# Patient Record
Sex: MALE | Race: WHITE | HISPANIC OR LATINO | Employment: FULL TIME | URBAN - METROPOLITAN AREA
[De-identification: names, ages, dates, MRNs, and addresses within clinical notes are randomized per-mention and may not be internally consistent; named-entity substitution may affect disease eponyms.]

---

## 2018-09-24 ENCOUNTER — HOSPITAL ENCOUNTER (EMERGENCY)
Facility: HOSPITAL | Age: 33
Discharge: HOME | End: 2018-09-24
Attending: EMERGENCY MEDICINE
Payer: COMMERCIAL

## 2018-09-24 VITALS
OXYGEN SATURATION: 100 % | WEIGHT: 145 LBS | RESPIRATION RATE: 18 BRPM | SYSTOLIC BLOOD PRESSURE: 143 MMHG | BODY MASS INDEX: 24.75 KG/M2 | HEART RATE: 88 BPM | HEIGHT: 64 IN | TEMPERATURE: 98 F | DIASTOLIC BLOOD PRESSURE: 92 MMHG

## 2018-09-24 DIAGNOSIS — G45.9 TIA (TRANSIENT ISCHEMIC ATTACK): ICD-10-CM

## 2018-09-24 DIAGNOSIS — G45.0 VERTEBROBASILAR ARTERY SYNDROME: ICD-10-CM

## 2018-09-24 DIAGNOSIS — H53.9 VISUAL DISTURBANCE: Primary | ICD-10-CM

## 2018-09-24 LAB
CHOLEST SERPL-MCNC: 173 MG/DL
CHOLEST/HDLC SERPL: 2.6 {RATIO}
DIASTOLIC DYSFUNCTION: NO
ESTIMATED AVG GLUCOSE: 97 MG/DL
ESTIMATED PA SYSTOLIC PRESSURE: 19.48
HBA1C MFR BLD HPLC: 5 %
HDLC SERPL-MCNC: 66 MG/DL
HDLC SERPL: 38.2 %
LDLC SERPL CALC-MCNC: 89.6 MG/DL
MITRAL VALVE MOBILITY: NORMAL
MITRAL VALVE REGURGITATION: NORMAL
NONHDLC SERPL-MCNC: 107 MG/DL
RETIRED EF AND QEF - SEE NOTES: 55 (ref 55–65)
TRIGL SERPL-MCNC: 87 MG/DL
TSH SERPL DL<=0.005 MIU/L-ACNC: 1.31 UIU/ML

## 2018-09-24 PROCEDURE — 93306 TTE W/DOPPLER COMPLETE: CPT | Mod: 26,,, | Performed by: INTERNAL MEDICINE

## 2018-09-24 PROCEDURE — 80061 LIPID PANEL: CPT

## 2018-09-24 PROCEDURE — 25000003 PHARM REV CODE 250: Performed by: PSYCHIATRY & NEUROLOGY

## 2018-09-24 PROCEDURE — 99284 EMERGENCY DEPT VISIT MOD MDM: CPT | Mod: 25

## 2018-09-24 PROCEDURE — 84443 ASSAY THYROID STIM HORMONE: CPT

## 2018-09-24 PROCEDURE — 93306 TTE W/DOPPLER COMPLETE: CPT

## 2018-09-24 PROCEDURE — 99285 EMERGENCY DEPT VISIT HI MDM: CPT | Mod: ,,,

## 2018-09-24 PROCEDURE — 99283 EMERGENCY DEPT VISIT LOW MDM: CPT | Mod: ,,, | Performed by: PSYCHIATRY & NEUROLOGY

## 2018-09-24 PROCEDURE — 83036 HEMOGLOBIN GLYCOSYLATED A1C: CPT

## 2018-09-24 RX ORDER — ASPIRIN 325 MG
325 TABLET ORAL DAILY
Status: DISCONTINUED | OUTPATIENT
Start: 2018-09-24 | End: 2018-09-24 | Stop reason: HOSPADM

## 2018-09-24 RX ADMIN — ASPIRIN 325 MG ORAL TABLET 325 MG: 325 PILL ORAL at 10:09

## 2018-09-24 NOTE — ED TRIAGE NOTES
Pt reports around 0700 today began having vision issues. Reports was sitting at a computer reports seeing a dot to center of vision in both eyes. Reports feels like dot is moving left, currently reports diminished/blurred vision to right medial side and left lateral side. Not sure if caffeine related, had coffee this am. Denies any contact or glasses use. Denies any eye surgeries. Denies trauma or other complaints.

## 2018-09-24 NOTE — DISCHARGE INSTRUCTIONS
Please follow up with Dr. Velasquez in 4 weeks.     Our goal in the emergency department is to always give you outstanding care and exceptional service. You may receive a survey by mail or e-mail in the next week regarding your experience in our ED. We would greatly appreciate your completing and returning the survey. Your feedback provides us with a way to recognize our staff who give very good care and it helps us learn how to improve when your experience was below our aspiration of excellence.

## 2018-09-24 NOTE — ED NOTES
Patient identifiers have been checked and are correct.      LOC: The patient is awake, alert, and aware of environment. The patient is oriented x 3 and speaking appropriately.   APPEARANCE: No acute distress noted.   PSYCHOSOCIAL: Patient is calm and cooperative.   SKIN: The skin is warm, dry  RESPIRATORY: Airway is open and patent. Bilateral chest rise and fall. Respirations are spontaneous, even and unlabored. Normal effort and rate noted. No accessory muscle use noted.   MUSCULOSKELETAL: No obvious deformities noted.

## 2018-09-24 NOTE — ED NOTES
Pt updated on POC- waiting for vascular neurology. Pt denies any current complaints, reports vision symptoms have resolved and that PA was aware. Reports vision back to baseline. Will continue to monitor.

## 2018-09-24 NOTE — CONSULTS
[facility]  Vascular Neurology  Comprehensive Stroke Center  Consult Note    Inpatient consult to Vascular (Stroke) Neurology  Consult performed by: Allyson Garcia MD  Consult ordered by: Cici Mclean PA-C        Assessment/Plan:     Patient is a 33 y.o. year old male with:    Vertebrobasilar artery syndrome    33 yr old male with no pertinent PMHx who presented with transient left hemianopsia without other neurologic complaints. Worked up for TIA starting with MRI/MRA brain and neck    Antithrombotics for secondary stroke prevention: Antiplatelets: Aspirin: 325 mg daily    Statins for secondary stroke prevention and hyperlipidemia, if present:   If LDL>100, recommend starting atorvastatin 40mg qd    Aggressive risk factor modification: None     Rehab efforts: None: Reason: No deficits    Diagnostics ordered/pending: HgbA1C to assess blood glucose levels, Lipid Profile to assess cholesterol levels, TTE to assess cardiac function/status , TSH to assess thyroid function, Other: TCD with IV contrast to rule out PFO    BP parameters: SBP <140    MRI brain without evidence of acute infarct and MRA neck unremarkable for area of focal stenosis. Patient requesting discharge home with close outpatient follow up. 2D ECHO and tcd bubble study to be done as an outpatient.             STROKE DOCUMENTATION          NIH Scale:  1a. Level Of Consciousness: 0-->Alert: keenly responsive  1b. LOC Questions: 0-->Answers both questions correctly  1c. LOC Commands: 0-->Performs both tasks correctly  2. Best Gaze: 0-->Normal  3. Visual: 0-->No visual loss  4. Facial Palsy: 0-->Normal symmetrical movements  5a. Motor Arm, Left: 0-->No drift: limb holds 90 (or 45) degrees for full 10 secs  5b. Motor Arm, Right: 0-->No drift: limb holds 90 (or 45) degrees for full 10 secs  6a. Motor Leg, Left: 0-->No drift: leg holds 30 degree position for full 5 secs  6b. Motor Leg, Right: 0-->No drift: leg holds 30 degree  position for full 5 secs  7. Limb Ataxia: 0-->Absent  8. Sensory: 0-->Normal: no sensory loss  9. Best Language: 0-->No aphasia: normal  10. Dysarthria: 0-->Normal  11. Extinction and Inattention (formerly Neglect): 0-->No abnormality  Total (NIH Stroke Scale): 0    Modified Yalobusha Score: 0  Sean Coma Scale:15   ABCD2 Score: 2  [id number] Score:   HAS -BLED Score:   ICH Score:   Hunt & Marlow Classification:       Thrombolysis Candidate? No, NIH 0. TIA      Interventional Revascularization Candidate?   Is the patient eligible for mechanical endovascular reperfusion (ROBER)?  No; No large vessel occlusion and No; No significant neurological deficit      Hemorrhagic change of an Ischemic Stroke: Does this patient have an ischemic stroke with hemorrhagic changes? 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 He noticed that he was seeing an area of blurry vision in the middle of his visual field initially but then later noticed that when he covered his right eye, the vision was blurry in the left side of his visual field and the same when he covered his left eye, visual field on the left again was blurry. Symptoms lasted for about 30 minutes before resolution.  No prior episodes similar to this in the past.   Takes no medications - was on vyvanse in the past and stopped taking it in May 2018. No personal history of hypercoagulable states.  No family history of strokes         Past Medical History:   Diagnosis Date    Asthma      Past Surgical History:   Procedure Laterality Date    HERNIA REPAIR      URETER SURGERY      As a child     Family History   Problem Relation Age of Onset    Hypertension Mother     Hernia Maternal Uncle     Hernia Maternal Grandmother     Hernia Maternal Uncle      Social History     Tobacco Use     Smoking status: Never Smoker   Substance Use Topics    Alcohol use: Yes     Comment: occasional    Drug use: No     Review of patient's allergies indicates:  No Known Allergies    Medications: I have reviewed the current medication administration record.      (Not in a hospital admission)    Review of Systems   Constitutional: Negative for fatigue.   HENT: Negative for trouble swallowing.    Respiratory: Negative for shortness of breath.    Cardiovascular: Negative for chest pain.   Gastrointestinal: Negative for abdominal pain.   Musculoskeletal: Negative for back pain.   Neurological: Negative for weakness, numbness and headaches.   Psychiatric/Behavioral: Negative for agitation.     Objective:     Vital Signs (Most Recent):  Temp: 97.8 °F (36.6 °C) (09/24/18 0740)  Pulse: 88 (09/24/18 0740)  Resp: 18 (09/24/18 0740)  BP: (!) 143/92 (09/24/18 0740)  SpO2: 100 % (09/24/18 0740)    Vital Signs Range (Last 24H):  Temp:  [97.8 °F (36.6 °C)]   Pulse:  [88]   Resp:  [18]   BP: (143)/(92)   SpO2:  [100 %]     Physical Exam   Constitutional: He appears well-developed and well-nourished.   HENT:   Head: Normocephalic and atraumatic.   Eyes: EOM are normal. Pupils are equal, round, and reactive to light.       Neurological Exam:   LOC: alert  Language: No aphasia  Articulation: No dysarthria  Orientation: Person, Place, Time   Visual Fields: Full  EOM (CN III, IV, VI): Full/intact  Pupils (CN II, III): PERRL  Facial Sensation (CN V): Normal  Facial Movement (CN VII): Symmetric facial expression    Reflexes: 2+ throughout  Motor: Arm left  Normal 5/5  Leg left  Normal 5/5  Arm right  Normal 5/5  Leg right Normal 5/5  Cebellar: No evidence of appendicular or axial ataxia  Sensation: Intact to light touch, temperature and vibration  Tone: Normal tone throughout      Laboratory:  CMP: No results for input(s): GLUCOSE, CALCIUM, ALBUMIN, PROT, NA, K, CO2, CL, BUN, CREATININE, ALKPHOS, ALT, AST, BILITOT in the last 24  hours.  CBC: No results for input(s): WBC, RBC, HGB, HCT, PLT, MCV, MCH, MCHC in the last 168 hours.  Lipid Panel:   Recent Labs   Lab  09/24/18   1015   CHOL  173   LDLCALC  89.6   HDL  66   TRIG  87     Coagulation: No results for input(s): PT, INR, APTT in the last 168 hours.  Hgb A1C: No results for input(s): HGBA1C in the last 168 hours.  TSH: No results for input(s): TSH in the last 168 hours.    Diagnostic Results:      Brain imaging:    MRI/MRA brain WO contrast (9/24/18)  No acute infarct and no area of focal stenosis in the intra and extracranial vessels.      Allyson Garcia MD  Comprehensive Stroke Center  Department of Vascular Neurology   [facility]

## 2018-09-24 NOTE — ASSESSMENT & PLAN NOTE
33 yr old male with no pertinent PMHx who presented with transient left hemianopsia without other neurologic complaints. Will work up for TIA - starting with MRI/MRA brain and neck    Antithrombotics for secondary stroke prevention: Antiplatelets: Aspirin: 325 mg daily    Statins for secondary stroke prevention and hyperlipidemia, if present:   If LDL>100, recommend starting atorvastatin 40mg qd    Aggressive risk factor modification: None     Rehab efforts: None: Reason: No deficits    Diagnostics ordered/pending: HgbA1C to assess blood glucose levels, Lipid Profile to assess cholesterol levels, TTE to assess cardiac function/status , TSH to assess thyroid function, Other: TCD with IV contrast to rule out PFO    BP parameters: SBP <140    MRI and MRA neck unremarkable. Patient requesting discharge home with close outpatient follow up. 2D ECHO and tcd bubble study to be done as an outpatient.

## 2018-09-24 NOTE — ED NOTES
IV removed with catheter intact, pressure dressing applied and bleeding controlled. Pt provided with discharge information - sts understanding. No additional questions or concerns at this time. Pt ambulatory with steady gait in stable condition out of ED.

## 2018-09-24 NOTE — ED NOTES
Bedside report with KIM Guerrero. Pt sitting upright in recliner. No apparent distress noted, resp w/ ease. TEETEE Bolanos at bedside updating pt with plan of care and wait time. No needs or concerns voiced at this time.

## 2018-09-24 NOTE — SUBJECTIVE & OBJECTIVE
Past Medical History:   Diagnosis Date    Asthma      Past Surgical History:   Procedure Laterality Date    HERNIA REPAIR      URETER SURGERY      As a child     Family History   Problem Relation Age of Onset    Hypertension Mother     Hernia Maternal Uncle     Hernia Maternal Grandmother     Hernia Maternal Uncle      Social History     Tobacco Use    Smoking status: Never Smoker   Substance Use Topics    Alcohol use: Yes     Comment: occasional    Drug use: No     Review of patient's allergies indicates:  No Known Allergies    Medications: I have reviewed the current medication administration record.      (Not in a hospital admission)    Review of Systems   Constitutional: Negative for fatigue.   HENT: Negative for trouble swallowing.    Respiratory: Negative for shortness of breath.    Cardiovascular: Negative for chest pain.   Gastrointestinal: Negative for abdominal pain.   Musculoskeletal: Negative for back pain.   Neurological: Negative for weakness, numbness and headaches.   Psychiatric/Behavioral: Negative for agitation.     Objective:     Vital Signs (Most Recent):  Temp: 97.8 °F (36.6 °C) (09/24/18 0740)  Pulse: 88 (09/24/18 0740)  Resp: 18 (09/24/18 0740)  BP: (!) 143/92 (09/24/18 0740)  SpO2: 100 % (09/24/18 0740)    Vital Signs Range (Last 24H):  Temp:  [97.8 °F (36.6 °C)]   Pulse:  [88]   Resp:  [18]   BP: (143)/(92)   SpO2:  [100 %]     Physical Exam   Constitutional: He appears well-developed and well-nourished.   HENT:   Head: Normocephalic and atraumatic.   Eyes: EOM are normal. Pupils are equal, round, and reactive to light.       Neurological Exam:   LOC: alert  Language: No aphasia  Articulation: No dysarthria  Orientation: Person, Place, Time   Visual Fields: Full  EOM (CN III, IV, VI): Full/intact  Pupils (CN II, III): PERRL  Facial Sensation (CN V): Normal  Facial Movement (CN VII): Symmetric facial expression    Reflexes: 2+ throughout  Motor: Arm left  Normal 5/5  Leg left   Normal 5/5  Arm right  Normal 5/5  Leg right Normal 5/5  Cebellar: No evidence of appendicular or axial ataxia  Sensation: Intact to light touch, temperature and vibration  Tone: Normal tone throughout      Laboratory:  CMP: No results for input(s): GLUCOSE, CALCIUM, ALBUMIN, PROT, NA, K, CO2, CL, BUN, CREATININE, ALKPHOS, ALT, AST, BILITOT in the last 24 hours.  CBC: No results for input(s): WBC, RBC, HGB, HCT, PLT, MCV, MCH, MCHC in the last 168 hours.  Lipid Panel:   Recent Labs   Lab  09/24/18   1015   CHOL  173   LDLCALC  89.6   HDL  66   TRIG  87     Coagulation: No results for input(s): PT, INR, APTT in the last 168 hours.  Hgb A1C: No results for input(s): HGBA1C in the last 168 hours.  TSH: No results for input(s): TSH in the last 168 hours.    Diagnostic Results:      Brain imaging:    MRI/MRA brain WO contrast (9/24/18)  No acute infarct and no area of focal stenosis in the intra and extracranial vessels.

## 2018-09-24 NOTE — ED PROVIDER NOTES
Encounter Date: 9/24/2018       History     Chief Complaint   Patient presents with    Eye Problem     L eye lasteral and r eye medial side of blackness, no other symptoms     33 y.o. male with medical history of asthma presents to the ED with visual disturbance. Patient is nurse in [employer]. Reports blurred vision to central vision that migrated to right medial and left lateral visual fields while charting patinet's vitals. Episode lasted for 20 minutes and resolved. Denies headache, lightheadedness, chest pain, shortness of breath, unilateral weakness, sinus congestion, foreign body, contact use, family history of CAD. Denies eye pain, loss of visual field. Patient is currently asymptomatic.          Review of patient's allergies indicates:  No Known Allergies  Past Medical History:   Diagnosis Date    Asthma      Past Surgical History:   Procedure Laterality Date    HERNIA REPAIR      URETER SURGERY      As a child     Family History   Problem Relation Age of Onset    Hypertension Mother     Hernia Maternal Uncle     Hernia Maternal Grandmother     Hernia Maternal Uncle      Social History     Tobacco Use    Smoking status: Never Smoker   Substance Use Topics    Alcohol use: Yes     Comment: occasional    Drug use: No     Review of Systems   Constitutional: Negative for chills and fever.   HENT: Negative for congestion, rhinorrhea, sinus pressure, sinus pain and sore throat.    Eyes: Positive for visual disturbance. Negative for pain, discharge and itching.   Respiratory: Negative for shortness of breath.    Cardiovascular: Negative for chest pain.   Gastrointestinal: Negative for nausea and vomiting.   Musculoskeletal: Negative for back pain.   Skin: Negative for rash.   Neurological: Negative for syncope, weakness, light-headedness and headaches.   Psychiatric/Behavioral: The patient is not nervous/anxious.        Physical Exam     Initial Vitals [09/24/18 0740]   BP Pulse Resp Temp SpO2   (!) 143/92 88  18 97.8 °F (36.6 °C) 100 %      MAP       --         Physical Exam    Vitals reviewed.  Constitutional: He appears well-developed and well-nourished. He is not diaphoretic. No distress.   HENT:   Head: Normocephalic and atraumatic.   Nose: Nose normal.   Mouth/Throat: Oropharynx is clear and moist. No oropharyngeal exudate.   Eyes: Conjunctivae, EOM and lids are normal. Pupils are equal, round, and reactive to light. Lids are everted and swept, no foreign bodies found. Right eye exhibits no chemosis, no discharge, no exudate and no hordeolum. No foreign body present in the right eye. Left eye exhibits no chemosis, no discharge, no exudate and no hordeolum. No foreign body present in the left eye.   Neck: Normal range of motion. Neck supple. No thyromegaly present.   Cardiovascular: Normal rate and intact distal pulses.   Pulmonary/Chest: Breath sounds normal. No respiratory distress. He has no wheezes. He exhibits no tenderness.   Abdominal: Soft. Bowel sounds are normal. He exhibits no distension. There is no tenderness. There is no rebound.   Musculoskeletal: Normal range of motion.   Lymphadenopathy:     He has no cervical adenopathy.   Neurological: He is alert and oriented to person, place, and time. He has normal strength. No sensory deficit.   Skin: Skin is warm and dry. Capillary refill takes less than 2 seconds. No rash noted.   Psychiatric: He has a normal mood and affect.         ED Course   Procedures  Labs Reviewed   LIPID PANEL   HEMOGLOBIN A1C   TSH          Imaging Results          US TCD EMBOLI WITH IV INJ (In process)                MRA Brain without contrast (Final result)  Result time 09/24/18 10:32:43   Procedure changed from MRA Brain with contrast     Final result by Abdulaziz Katz DO (09/24/18 10:32:43)                 Impression:      Unremarkable MRA of the head specifically without evidence for focal stenosis or intracranial aneurysm.    Unremarkable motion limited MRA of the neck as  detailed above without evidence for significant focal stenosis or proximal occlusion.      Electronically signed by: Abdulaziz Katz DO  Date:    09/24/2018  Time:    10:32             Narrative:    EXAMINATION:  MRA NECK WITHOUT CONTRAST; MRA BRAIN WITHOUT CONTRAST    CLINICAL HISTORY:  tia;    TECHNIQUE:  noncontrast 3-D time of flight MRA head and noncontrast 2-D and 3D  time-of-flight MRA neck.    COMPARISON:  None    FINDINGS:  MRA head:    Anterior circulation:  The bilateral distal cervical, Gillian, cavernous and supraclinoid segments of the ICA's and the visualized bilateral anterior and middle cerebral arteries are patent without evidence for significant focal stenosis or aneurysm.    Posterior circulation: The distal left vertebral artery is slightly dominant.  The distal vertebral arteries, basilar artery and posterior cerebral arteries are patent without evidence for significant focal stenosis or aneurysm.    MRA neck: Study limited by patient motion.  Allowing for limitation the origins of the right brachycephalic,  left common carotid and left subclavian arteries from the arch are within normal limits. The origins of the vertebral arteries from the subclavian arteries are within normal limits.  Left vertebral artery diminutive throughout its course allowing for motion limitation vertebral arteries are patent without high-grade focal stenosis or occlusion..    The bilateral common carotid arteries, carotid bifurcations and extracranial portions of the internal carotid arteries are patent without evidence for significant focal stenosis allowing for motion.  There is tortuosity of the distal cervical ICAs bilaterally    Less than 50% proximal ICA stenosis by NASCET criteria.                               MRA Neck without contrast (Final result)  Result time 09/24/18 10:32:43    Final result by Abdulaziz Katz DO (09/24/18 10:32:43)                 Impression:      Unremarkable MRA of the head specifically  without evidence for focal stenosis or intracranial aneurysm.    Unremarkable motion limited MRA of the neck as detailed above without evidence for significant focal stenosis or proximal occlusion.      Electronically signed by: Abdulaziz Katz DO  Date:    09/24/2018  Time:    10:32             Narrative:    EXAMINATION:  MRA NECK WITHOUT CONTRAST; MRA BRAIN WITHOUT CONTRAST    CLINICAL HISTORY:  tia;    TECHNIQUE:  noncontrast 3-D time of flight MRA head and noncontrast 2-D and 3D  time-of-flight MRA neck.    COMPARISON:  None    FINDINGS:  MRA head:    Anterior circulation:  The bilateral distal cervical, Gillian, cavernous and supraclinoid segments of the ICA's and the visualized bilateral anterior and middle cerebral arteries are patent without evidence for significant focal stenosis or aneurysm.    Posterior circulation: The distal left vertebral artery is slightly dominant.  The distal vertebral arteries, basilar artery and posterior cerebral arteries are patent without evidence for significant focal stenosis or aneurysm.    MRA neck: Study limited by patient motion.  Allowing for limitation the origins of the right brachycephalic,  left common carotid and left subclavian arteries from the arch are within normal limits. The origins of the vertebral arteries from the subclavian arteries are within normal limits.  Left vertebral artery diminutive throughout its course allowing for motion limitation vertebral arteries are patent without high-grade focal stenosis or occlusion..    The bilateral common carotid arteries, carotid bifurcations and extracranial portions of the internal carotid arteries are patent without evidence for significant focal stenosis allowing for motion.  There is tortuosity of the distal cervical ICAs bilaterally    Less than 50% proximal ICA stenosis by NASCET criteria.                               MRI Brain Without Contrast (Final result)  Result time 09/24/18 10:20:14    Final result by  Abdulaziz Katz DO (09/24/18 10:20:14)                 Impression:      Mild moderate patchy paranasal sinus opacities maxillary antra and left posterior ethmoid air cells as detailed above    Otherwise unremarkable noncontrast MRI brain as detailed above specifically without evidence for acute infarction or parenchymal signal abnormality.      Electronically signed by: Abdulaziz Katz DO  Date:    09/24/2018  Time:    10:20             Narrative:    EXAMINATION:  MRI BRAIN WITHOUT CONTRAST    CLINICAL HISTORY:  transient left hemianopsia;    TECHNIQUE:  Sagittal and axial T1, axial T2, axial FLAIR, axial gradient and axial diffusion imaging of the whole brain without contrast.    COMPARISON:  None    FINDINGS:  The brain parenchyma is normal in signal and contour.  The ventricles are normal in size and configuration without evidence for hydrocephalus.  There is no midline shift or mass effect.  There is no abnormal parenchymal susceptibility to suggest parenchymal hemorrhage.  No restricted diffusion to suggest acute infarction.  There is no abnormal intra or extra-axial fluid collection.  The major intracranial T2 flow voids are present.    There is lobular fluid signal opacities in the maxillary antra suggestive for mucous retention cysts.    There is additional mixed signal opacification left posterior ethmoid air cells and left aspect sphenoid sinus concerning for mucosal thickening and possible proteinaceous mucous retention cyst.                                 Medical Decision Making:   History:   Old Medical Records: I decided to obtain old medical records.  Old Records Summarized: records from clinic visits.  Initial Assessment:   33 y.o. male with medical history of asthma presents to the ED with visual disturbance at 7a while charting patient's vitals. Reports started in central visual field and migrated to left lateral visual field and right medial visual field. Episode lasted 20 minutes. Denies  weakness, headache, eye pain. Currently asymptomatic. Right visual field 20/20. Left visual field 20/25. Neurologically intact. No ataxia  Differential Diagnosis:   DDX includes but is not limited to anxiety, conjunctivitis, uveitis, acute intracranial abnormality. Considered but do not suspect retinal detachment, CRAO.  Clinical Tests:   Lab Tests: Reviewed and Ordered  Radiological Study: Ordered and Reviewed  ED Management:  Will consult vascular neurology.     Vascular neurology added on MRI, MRA, echo and TCD.    MRI and MRA negative. Dr. Triana states patient can follow up for echo and TCD results. Patient remains asymptomatic. On repeat neuro exam, all visual fields intact. Strength symmetric. Sensation intact. No facial droop. Patient will follow up in 4 weeks with vascular neurology.    I have discussed the treatment and management of this patient with my supervisory physician, and we agree on the plan of care.                         Clinical Impression:   The primary encounter diagnosis was Visual disturbance. Diagnoses of Vertebrobasilar artery syndrome and TIA (transient ischemic attack) were also pertinent to this visit.      Disposition:   Disposition: Discharged  Condition: Stable                        Cici Mclean PA-C  09/24/18 [id number]

## 2018-09-24 NOTE — HPI
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 He noticed that he was seeing an area of blurry vision in the middle of his visual field initially but then later noticed that when he covered his right eye, the vision was blurry in the left side of his visual field and the same when he covered his left eye, visual field on the left again was blurry. Symptoms lasted for about 30 minutes before resolution.  No prior episodes similar to this in the past.   Takes no medications - was on vyvanse in the past and stopped taking it in May 2018. No personal history of hypercoagulable states.  No family history of strokes

## 2018-09-24 NOTE — ED NOTES
Bedside report with KIM Guerrero. Pt sitting upright in recliner, no apparent distress noted. No complaints or concerns voiced at this time.

## 2018-09-27 ENCOUNTER — TELEPHONE (OUTPATIENT)
Dept: NEUROLOGY | Facility: CLINIC | Age: 33
End: 2018-09-27

## 2018-09-27 NOTE — TELEPHONE ENCOUNTER
----- Message from Imer Ashley sent at 9/27/2018  3:48 PM CDT -----  Contact: Pt. [phone]  Needs Advice    Reason for call:The patient would like to speak to someone regarding scheduling a hosp follow up for possible TIA. Please contact the patient to discuss further.          Communication Preference:PHONE     Additional Information:

## 2018-09-28 NOTE — TELEPHONE ENCOUNTER
----- Message from Stacy Jones sent at 9/27/2018  6:26 PM CDT -----  Contact: PT Portal Request  Appointment Request From: Bert Bob    With Provider: Dr. Kanu Velasquez    Preferred Date Range: 10/22/2018 - 10/26/2018    Preferred Times: Any time    Reason for visit: visual disturbance    Comments:  Was seen in the ED for bilateral blurry vision that lasted for 30 min in the am on 9/24/18. Also follow on results form TCD, MRI, and echo.

## 2018-10-03 ENCOUNTER — PATIENT MESSAGE (OUTPATIENT)
Dept: NEUROLOGY | Facility: CLINIC | Age: 33
End: 2018-10-03

## 2018-10-04 ENCOUNTER — OFFICE VISIT (OUTPATIENT)
Dept: INTERNAL MEDICINE | Facility: CLINIC | Age: 33
End: 2018-10-04
Payer: COMMERCIAL

## 2018-10-04 ENCOUNTER — LAB VISIT (OUTPATIENT)
Dept: LAB | Facility: HOSPITAL | Age: 33
End: 2018-10-04
Attending: INTERNAL MEDICINE
Payer: COMMERCIAL

## 2018-10-04 ENCOUNTER — PATIENT MESSAGE (OUTPATIENT)
Dept: INTERNAL MEDICINE | Facility: CLINIC | Age: 33
End: 2018-10-04

## 2018-10-04 VITALS
HEIGHT: 64 IN | BODY MASS INDEX: 24.05 KG/M2 | WEIGHT: 140.88 LBS | TEMPERATURE: 98 F | DIASTOLIC BLOOD PRESSURE: 78 MMHG | RESPIRATION RATE: 14 BRPM | HEART RATE: 76 BPM | SYSTOLIC BLOOD PRESSURE: 116 MMHG

## 2018-10-04 DIAGNOSIS — R00.2 PALPITATIONS: ICD-10-CM

## 2018-10-04 DIAGNOSIS — J45.20 MILD INTERMITTENT REACTIVE AIRWAY DISEASE WITHOUT COMPLICATION: ICD-10-CM

## 2018-10-04 DIAGNOSIS — Z00.00 ANNUAL PHYSICAL EXAM: ICD-10-CM

## 2018-10-04 DIAGNOSIS — Z00.00 ANNUAL PHYSICAL EXAM: Primary | ICD-10-CM

## 2018-10-04 PROBLEM — J45.909 REACTIVE AIRWAY DISEASE WITHOUT COMPLICATION: Status: ACTIVE | Noted: 2018-10-04

## 2018-10-04 LAB
ALBUMIN SERPL BCP-MCNC: 4.2 G/DL
ALP SERPL-CCNC: 70 U/L
ALT SERPL W/O P-5'-P-CCNC: 11 U/L
ANION GAP SERPL CALC-SCNC: 9 MMOL/L
AST SERPL-CCNC: 13 U/L
BASOPHILS # BLD AUTO: 0.03 K/UL
BASOPHILS NFR BLD: 0.5 %
BILIRUB SERPL-MCNC: 1.6 MG/DL
BUN SERPL-MCNC: 25 MG/DL
CALCIUM SERPL-MCNC: 9.6 MG/DL
CHLORIDE SERPL-SCNC: 105 MMOL/L
CO2 SERPL-SCNC: 24 MMOL/L
CREAT SERPL-MCNC: 1 MG/DL
DIFFERENTIAL METHOD: NORMAL
EOSINOPHIL # BLD AUTO: 0.1 K/UL
EOSINOPHIL NFR BLD: 2 %
ERYTHROCYTE [DISTWIDTH] IN BLOOD BY AUTOMATED COUNT: 12.2 %
EST. GFR  (AFRICAN AMERICAN): >60 ML/MIN/1.73 M^2
EST. GFR  (NON AFRICAN AMERICAN): >60 ML/MIN/1.73 M^2
GLUCOSE SERPL-MCNC: 90 MG/DL
HCT VFR BLD AUTO: 44.1 %
HGB BLD-MCNC: 14.7 G/DL
IMM GRANULOCYTES # BLD AUTO: 0.01 K/UL
IMM GRANULOCYTES NFR BLD AUTO: 0.2 %
LYMPHOCYTES # BLD AUTO: 1.5 K/UL
LYMPHOCYTES NFR BLD: 24.6 %
MCH RBC QN AUTO: 29.8 PG
MCHC RBC AUTO-ENTMCNC: 33.3 G/DL
MCV RBC AUTO: 90 FL
MONOCYTES # BLD AUTO: 0.7 K/UL
MONOCYTES NFR BLD: 11.2 %
NEUTROPHILS # BLD AUTO: 3.7 K/UL
NEUTROPHILS NFR BLD: 61.5 %
NRBC BLD-RTO: 0 /100 WBC
PLATELET # BLD AUTO: 240 K/UL
PMV BLD AUTO: 9.6 FL
POTASSIUM SERPL-SCNC: 4.3 MMOL/L
PROT SERPL-MCNC: 7.3 G/DL
RBC # BLD AUTO: 4.93 M/UL
SODIUM SERPL-SCNC: 138 MMOL/L
WBC # BLD AUTO: 5.98 K/UL

## 2018-10-04 PROCEDURE — 99385 PREV VISIT NEW AGE 18-39: CPT | Mod: S$GLB,,, | Performed by: INTERNAL MEDICINE

## 2018-10-04 PROCEDURE — 80053 COMPREHEN METABOLIC PANEL: CPT

## 2018-10-04 PROCEDURE — 93005 ELECTROCARDIOGRAM TRACING: CPT | Mod: S$GLB,,, | Performed by: INTERNAL MEDICINE

## 2018-10-04 PROCEDURE — 99999 PR PBB SHADOW E&M-EST. PATIENT-LVL III: CPT | Mod: PBBFAC,,, | Performed by: INTERNAL MEDICINE

## 2018-10-04 PROCEDURE — 36415 COLL VENOUS BLD VENIPUNCTURE: CPT | Mod: PO

## 2018-10-04 PROCEDURE — 85025 COMPLETE CBC W/AUTO DIFF WBC: CPT

## 2018-10-04 PROCEDURE — 93010 ELECTROCARDIOGRAM REPORT: CPT | Mod: S$GLB,,, | Performed by: INTERNAL MEDICINE

## 2018-10-04 RX ORDER — ASPIRIN 325 MG
325 TABLET ORAL DAILY
COMMUNITY
End: 2020-11-03

## 2018-10-04 NOTE — PROGRESS NOTES
Subjective:       Patient ID: Bert Bob is a 33 y.o. male.    Chief Complaint: Annual Exam (get est, ) and Hypertension    HPI    33 y.o. male here for annual exam.     Health Maintenance:   Lipid disorders/ASCVD risk: utd    DM: A1c 5.0  Eye exam: due      Vaccines:   Influenza (yearly) 10/3/18   Tetanus (every 10 yrs - 1st tdap) utd      Medical Problems:  1. Elevated BP: noted in ER recently. He has been monitoring at home - 120s/70s.  2. palpitations: occur almost daily. Notices when he lays down. Stopped drinking caffeine after ER visit. He denies associated chest pain, lightheadedness, N/V. Possibly some dyspnea, or just startled from the palpitations. Last about 2 seconds then resolve w/o intervention.      Past Medical History:   Diagnosis Date    Asthma      Past Surgical History:   Procedure Laterality Date    HERNIA REPAIR      TYMPANOSTOMY TUBE PLACEMENT      URETER SURGERY      As a child     Family History   Problem Relation Age of Onset    Hypertension Mother     Heart failure Mother     Hernia Maternal Uncle     Hernia Maternal Grandmother     Heart failure Maternal Grandmother     Hernia Maternal Uncle     Hypertension Father      Social History     Socioeconomic History    Marital status:      Spouse name: Not on file    Number of children: 1    Years of education: Not on file    Highest education level: Not on file   Social Needs    Financial resource strain: Not on file    Food insecurity - worry: Not on file    Food insecurity - inability: Not on file    Transportation needs - medical: Not on file    Transportation needs - non-medical: Not on file   Occupational History    Occupation: Registered Nurse     Employer: [employer]   Tobacco Use    Smoking status: Never Smoker   Substance and Sexual Activity    Alcohol use: Yes     Comment: occasional    Drug use: No    Sexual activity: Not on file   Other Topics Concern    Not on file  "  Social History Narrative    Not on file     Review of patient's allergies indicates:  No Known Allergies    Current Outpatient Medications:     aspirin 325 MG tablet, Take 325 mg by mouth once daily., Disp: , Rfl:       Review of Systems   Constitutional: Negative for fatigue and unexpected weight change.   HENT: Negative for dental problem, sinus pain and trouble swallowing.    Eyes: Positive for visual disturbance. Negative for discharge and redness.   Respiratory: Negative for cough and wheezing.    Cardiovascular: Positive for palpitations. Negative for chest pain and leg swelling.   Gastrointestinal: Negative for abdominal pain, blood in stool, constipation and diarrhea.   Genitourinary: Negative for difficulty urinating, dysuria and frequency.   Musculoskeletal: Negative for gait problem and joint swelling.   Skin: Negative for pallor, rash and wound.   Neurological: Negative for numbness and headaches.       Objective:        Vitals:    10/04/18 0806   BP: 116/78   BP Location: Right arm   Patient Position: Sitting   BP Method: Medium (Manual)   Pulse: 76   Resp: 14   Temp: 98.1 °F (36.7 °C)   TempSrc: Oral   Weight: 63.9 kg (140 lb 14 oz)   Height: 5' 4" (1.626 m)       Body mass index is 24.18 kg/m².    Physical Exam   Constitutional: He is oriented to person, place, and time. He appears well-developed. No distress.   HENT:   Head: Normocephalic and atraumatic.   Right Ear: Tympanic membrane is scarred.   Left Ear: Tympanic membrane is scarred.   Nose: Nose normal.   Mouth/Throat: Oropharynx is clear and moist.   Eyes: Conjunctivae and EOM are normal. Pupils are equal, round, and reactive to light.   Neck: Normal range of motion. Neck supple. No tracheal deviation present. No thyromegaly present.   Cardiovascular: Normal rate, regular rhythm, normal heart sounds and intact distal pulses.   Pulmonary/Chest: Effort normal and breath sounds normal.   Abdominal: Soft. Bowel sounds are normal. He exhibits no " distension and no mass. There is no tenderness.   Musculoskeletal: Normal range of motion. He exhibits no edema.   Lymphadenopathy:     He has no cervical adenopathy.   Neurological: He is alert and oriented to person, place, and time. No sensory deficit.   Skin: Skin is warm and dry. He is not diaphoretic. No cyanosis. Nails show no clubbing.   Psychiatric: He has a normal mood and affect. His behavior is normal. Judgment normal.       Assessment:     1. Annual physical exam    2. Palpitations    3. Mild intermittent reactive airway disease without complication           Plan:         1. Annual physical exam  - recent A1c, lipid panel, TSH reviewed  - flu vaccine w/ employee health  - tdap likely utd w/ employee health  - CBC auto differential; Future  - Comprehensive metabolic panel; Future    2. Palpitations  - EKG 12-lead; Future  - Holter monitor - 24 hour; Future    3. Mild intermittent reactive airway disease without complication  - only w/ illness. Has nebulizer at home.            Patient note was created using MModal Dictation.  Any errors in syntax or even information may not have been identified and edited on initial review prior to signing this note.

## 2018-10-09 ENCOUNTER — CLINICAL SUPPORT (OUTPATIENT)
Dept: CARDIOLOGY | Facility: CLINIC | Age: 33
End: 2018-10-09
Attending: INTERNAL MEDICINE
Payer: COMMERCIAL

## 2018-10-09 DIAGNOSIS — R00.2 PALPITATIONS: ICD-10-CM

## 2018-10-09 PROCEDURE — 93224 XTRNL ECG REC UP TO 48 HRS: CPT | Mod: S$GLB,,, | Performed by: INTERNAL MEDICINE

## 2018-10-09 NOTE — PROGRESS NOTES
24 hr Holter monitor placed as ordered. Patient and/or family verbalized understanding of instructions.

## 2018-10-12 ENCOUNTER — PATIENT MESSAGE (OUTPATIENT)
Dept: INTERNAL MEDICINE | Facility: CLINIC | Age: 33
End: 2018-10-12

## 2018-10-15 ENCOUNTER — OFFICE VISIT (OUTPATIENT)
Dept: NEUROLOGY | Facility: CLINIC | Age: 33
End: 2018-10-15
Payer: COMMERCIAL

## 2018-10-15 VITALS
WEIGHT: 140 LBS | DIASTOLIC BLOOD PRESSURE: 84 MMHG | HEIGHT: 62 IN | BODY MASS INDEX: 25.76 KG/M2 | SYSTOLIC BLOOD PRESSURE: 123 MMHG | HEART RATE: 69 BPM

## 2018-10-15 DIAGNOSIS — R29.818 TRANSIENT NEUROLOGICAL SYMPTOMS: Primary | ICD-10-CM

## 2018-10-15 PROCEDURE — 99999 PR PBB SHADOW E&M-EST. PATIENT-LVL III: CPT | Mod: PBBFAC,,, | Performed by: PSYCHIATRY & NEUROLOGY

## 2018-10-15 PROCEDURE — 99204 OFFICE O/P NEW MOD 45 MIN: CPT | Mod: S$GLB,,, | Performed by: PSYCHIATRY & NEUROLOGY

## 2018-10-15 PROCEDURE — 3008F BODY MASS INDEX DOCD: CPT | Mod: CPTII,S$GLB,, | Performed by: PSYCHIATRY & NEUROLOGY

## 2018-10-15 RX ORDER — PEAK FLOW METER
EACH MISCELLANEOUS
Refills: 0 | COMMUNITY
Start: 2018-08-26 | End: 2022-07-19

## 2018-10-15 NOTE — PROGRESS NOTES
Vascular Neurology  Clinic Note    Reason For Visit (Chief Complaint): transient visual disturbance    HPI: 33 y.o. right handed male with transient visual disturbance.  The aforementioned symptoms have never happened prior to the event. There are no identified triggers or modifying factors. There have been no recurrent events. There are no other associated symptoms.  Was at work, had blurry vision suddenly, noticed in each eye individually, noted to be in the center of the vision at first, and then slowly the blurry field that was at the center of eye began to move to the left and then eventually completely resolved. This took about 30 minutes.    Hx of vyvanse for ADHD, discontinued in May prior to the event. Coffee intake increased during this period of time that he was off of the medication. Noted to have palpitations during this time. During the morning of the event he had a very strong cup of coffee. He has since given up coffee and all of his palpitations have gone away. Also had negative Holter monitor.    Brain Imaging:  MRI negative'  Vessel Imaging:  MRA -  negative  Cardiac Evaluation:  CONCLUSIONS     1 - Normal left ventricular systolic function (EF 55-60%).     2 - No wall motion abnormalities.     3 - Normal left ventricular diastolic function.     4 - Normal right ventricular systolic function .     5 - The estimated PA systolic pressure is 19 mmHg.   Other:   TCD bubble - negative for shunt  Relevant Labwork:  Recent Labs   Lab  09/24/18   1015   HEMOGLOBIN A1C  5.0   LDL CHOLESTEROL  89.6   HDL  66   TRIGLYCERIDES  87   CHOLESTEROL  173       I independently viewed the above imaging studies in addition to reviewing the report.  I reviewed the above labwork.    Review of Systems  Msk: negative for muscle pain  Skin: negative for pruritis  Neuro: negative for headache  All others negative    Past Medical History  Past Medical History:   Diagnosis Date    Asthma      Family History  No relevant  "history   Social History  Never Smoker        Medication List           Accurate as of 10/15/18 11:59 PM. If you have any questions, ask your nurse or doctor.               CONTINUE taking these medications    aspirin 325 MG tablet     [facility]  Generic drug:  nebulizer and compressor            EXAM  Vital Signs:Blood pressure 123/84, pulse 69, height 5' 2" (1.575 m), weight 63.5 kg (140 lb).  General: well appearing without discomfort   Mental Status:alert, oriented to person - place - age - month   Language: able to name, repeat, comprehend commands   Cranial Nerves: EOMI, PERRL, no facial asymmetry, tongue to midline, palate midline  Motor: 5/5 power in all extremities, normal tone  Sensory: intact light touch bilaterally, intact proprioception bilaterally  Coordination: no ataxia on finger-to-nose or heel-to-shin testing; no truncal ataxia  Gait & Stance: normal    NIH Stroke Scale:    Level of Consciousness: 0 - alert  LOC Questions: 0 - answers both correctly  LOC Commands: 0 - performs both correctly  Best Gaze: 0 - normal  Visual: 0 - no visual loss  Facial Palsy: 0 - normal  Motor Left Arm: 0 - no drift  Motor Right Arm: 0 - no drift  Motor Left Le - no drift  Motor Right Le - no drift  Limb Ataxia: 0 - absent  Sensory: 0 - normal  Best Language: 0 - no aphasia  Dysarthria: 0 - normal articulation  Extinction and Inattention: 0 - no neglect  NIH Stroke Scale Total: 0    ___________________  ASSESSMENT & PLAN    Problem List Items Addressed This Visit        Unprioritized    Transient neurological symptoms - Primary    Current Assessment & Plan     Overall review of clinical history and diagnostic studies does not suggest TIA as most likely etiology. Favor cortical spreading depression event, possibly reflective of migraine aura sin headache, despite not history of migraine. The march of symptoms, characterization of the deficit starting likely at the occipital pole and " being central and macular and then moving as two dots to the left side of vision are all characteristic of such phenomena.  Recommend weaning aspirin to 81 mg x 2 weeks with eventual discontinuation.  No further neurovascular workup warranted at this time.               MD Alcira  Vascular Neurology  Office [phone]  Fax [phone]

## 2018-10-16 NOTE — ASSESSMENT & PLAN NOTE
Overall review of clinical history and diagnostic studies does not suggest TIA as most likely etiology. Favor cortical spreading depression event, possibly reflective of migraine aura sin headache, despite not history of migraine. The march of symptoms, characterization of the deficit starting likely at the occipital pole and being central and macular and then moving as two dots to the left side of vision are all characteristic of such phenomena.  Recommend weaning aspirin to 81 mg x 2 weeks with eventual discontinuation.  No further neurovascular workup warranted at this time.

## 2020-03-28 DIAGNOSIS — Z20.822 CLOSE EXPOSURE TO COVID-19 VIRUS: ICD-10-CM

## 2020-03-28 DIAGNOSIS — R06.02 SHORTNESS OF BREATH: ICD-10-CM

## 2020-03-28 DIAGNOSIS — R05.9 COUGH: Primary | ICD-10-CM

## 2020-03-29 ENCOUNTER — CLINICAL SUPPORT (OUTPATIENT)
Dept: INTERNAL MEDICINE | Facility: CLINIC | Age: 35
End: 2020-03-29
Payer: COMMERCIAL

## 2020-03-29 ENCOUNTER — TELEPHONE (OUTPATIENT)
Dept: INFECTIOUS DISEASES | Facility: CLINIC | Age: 35
End: 2020-03-29

## 2020-03-29 DIAGNOSIS — R06.02 SHORTNESS OF BREATH: ICD-10-CM

## 2020-03-29 DIAGNOSIS — R05.9 COUGH: ICD-10-CM

## 2020-03-29 LAB — SARS-COV-2 RNA RESP QL NAA+PROBE: DETECTED

## 2020-03-29 PROCEDURE — U0002 COVID-19 LAB TEST NON-CDC: HCPCS

## 2020-04-02 ENCOUNTER — NURSE TRIAGE (OUTPATIENT)
Dept: ADMINISTRATIVE | Facility: CLINIC | Age: 35
End: 2020-04-02

## 2020-04-02 NOTE — TELEPHONE ENCOUNTER
"  Covid home monitoring follow-up: Patient reports not feeling well due to cough. Reports taking Levalbuterol every 8 hrs for mild SOB and cough. Spoke with PCP this AM and prescribed antibiotics that he will receive today. Recently completed Z-Timothy but after talking with PCP, felt there was a need for more antibiotics. Reports feeling as if "chest is on fire". Patient denies need for ER visit as he is used to this since he has a history of asthma. The SOB is "no different from when I am dealing with asthma." Able to talk in clear, complete sentences. Denies fever today, but did not check temperature. Last pm temp reading 100.6. Using Tylenol prn. Reports occasional nausea and diarrhea.  Drinking plenty of fluids; decreased appetite.  Self-isolating; sent family away. Has supportive system in place.   Encouraged to maintain hydration with fluids.  Discussed OTC medication as needed for symptomatic treatment of cough.  Discussed ER precautions: Instructed to report to ER if severe or worsening symptoms of SOB/difficulty breathing develops.      Reason for Disposition   Fever or feeling feverish within 14 Days of COVID-19 EXPOSURE    Additional Information   Negative: Severe difficulty breathing (e.g., struggling for each breath, speak in single words, bluish lips)   Negative: Sounds like a life-threatening emergency to the triager   Negative: Difficulty breathing occurs within 14 days of COVID-19 EXPOSURE   Negative: Patient sounds very sick or weak to the triager   Negative: Severe difficulty breathing (e.g., struggling for each breath, speaks in single words)   Negative: Sounds like a life-threatening emergency to the triager   Negative: Patient sounds very sick or weak to the triager   Negative: Difficulty breathing    Protocols used: CORONAVIRUS (COVID-19) EXPOSURE-A-OH, COUGH-A-OH      "

## 2020-04-03 ENCOUNTER — HOSPITAL ENCOUNTER (EMERGENCY)
Facility: HOSPITAL | Age: 35
Discharge: HOME | End: 2020-04-03
Attending: EMERGENCY MEDICINE
Payer: COMMERCIAL

## 2020-04-03 VITALS
SYSTOLIC BLOOD PRESSURE: 140 MMHG | OXYGEN SATURATION: 100 % | DIASTOLIC BLOOD PRESSURE: 72 MMHG | RESPIRATION RATE: 16 BRPM | TEMPERATURE: 98 F | HEART RATE: 91 BPM

## 2020-04-03 DIAGNOSIS — R06.02 SOB (SHORTNESS OF BREATH): ICD-10-CM

## 2020-04-03 DIAGNOSIS — U07.1 COVID-19 VIRUS INFECTION: Primary | ICD-10-CM

## 2020-04-03 PROCEDURE — 99283 EMERGENCY DEPT VISIT LOW MDM: CPT | Mod: 25

## 2020-04-03 PROCEDURE — 99284 EMERGENCY DEPT VISIT MOD MDM: CPT | Mod: ,,, | Performed by: PHYSICIAN ASSISTANT

## 2020-04-03 PROCEDURE — 99284 PR EMERGENCY DEPT VISIT,LEVEL IV: ICD-10-PCS | Mod: ,,, | Performed by: PHYSICIAN ASSISTANT

## 2020-04-03 RX ORDER — HYDROXYZINE HYDROCHLORIDE 25 MG/1
25 TABLET, FILM COATED ORAL EVERY 6 HOURS PRN
Qty: 12 TABLET | Refills: 0 | Status: SHIPPED | OUTPATIENT
Start: 2020-04-03 | End: 2020-11-03

## 2020-04-03 NOTE — ED PROVIDER NOTES
Encounter Date: 4/3/2020       History     Chief Complaint   Patient presents with    COVD +/SOB     swabbed positive and is having increased shortness of breath as of an hour ago.  Initial room air saturation of 97% Patient reports he feels more weak today.      Patient is a 35-year-old male presenting to the emergency department with complaints of shortness of breath.  The patient states that been sick for about 1 week, stating he has had fever, cough, shortness of breath.  He reports a maximum temperature at home of 101° F.  He admits that he had a positive COVID-19 test.  He states that he was placed on Plaquenil and azithromycin by his family doctor.  He states he has almost completed his medications.  He admits that today he started feeling short of breath while walking short distances as well as talking.  Also states that he is starting to feel much more anxious. This is the extent of the patient's complaints at this time.       The history is provided by the patient.     Review of patient's allergies indicates:  No Known Allergies  Past Medical History:   Diagnosis Date    Asthma      Past Surgical History:   Procedure Laterality Date    HERNIA REPAIR      TYMPANOSTOMY TUBE PLACEMENT      URETER SURGERY      As a child     Family History   Problem Relation Age of Onset    Hypertension Mother     Heart failure Mother     Hernia Maternal Uncle     Hernia Maternal Grandmother     Heart failure Maternal Grandmother     Hernia Maternal Uncle     Hypertension Father      Social History     Tobacco Use    Smoking status: Never Smoker    Smokeless tobacco: Never Used   Substance Use Topics    Alcohol use: Yes     Comment: occasional    Drug use: No     Review of Systems   Constitutional: Positive for fever. Negative for activity change, chills and fatigue.   HENT: Negative for congestion, rhinorrhea and sore throat.    Eyes: Negative for photophobia and visual disturbance.   Respiratory: Positive for  cough and shortness of breath.    Cardiovascular: Negative for chest pain.   Gastrointestinal: Negative for abdominal pain, diarrhea, nausea and vomiting.   Genitourinary: Negative for dysuria, hematuria and urgency.   Musculoskeletal: Negative for back pain, myalgias and neck pain.   Skin: Negative for color change and wound.   Neurological: Negative for weakness and headaches.   Psychiatric/Behavioral: Negative for agitation and confusion.       Physical Exam     Initial Vitals [04/03/20 1627]   BP Pulse Resp Temp SpO2   (!) 143/82 90 20 97.5 °F (36.4 °C) 98 %      MAP       --         Physical Exam    Nursing note and vitals reviewed.  Constitutional: Vital signs are normal. He appears well-developed and well-nourished. He is not diaphoretic.  Non-toxic appearance. He does not have a sickly appearance. No distress.   Anxious appearing  male accompanied the emergency room.  Speaking clearly full sentences.  No acute distress.   HENT:   Head: Normocephalic and atraumatic.   Right Ear: External ear normal.   Left Ear: External ear normal.   Nose: Nose normal.   Mouth/Throat: Oropharynx is clear and moist.   Eyes: Conjunctivae and EOM are normal.   Neck: Normal range of motion. Neck supple.   Cardiovascular: Normal rate.   Pulmonary/Chest: Effort normal.   Musculoskeletal: Normal range of motion.   Neurological: He is alert and oriented to person, place, and time.   Skin: Skin is warm.   Psychiatric: He has a normal mood and affect. His behavior is normal. Judgment and thought content normal.         ED Course   Procedures  Labs Reviewed - No data to display       Imaging Results          X-Ray Chest AP Portable (Final result)  Result time 04/03/20 17:45:49    Final result by Cristi Garcia MD (04/03/20 17:45:49)                 Impression:      1. No acute cardiopulmonary process.      Electronically signed by: Cristi Garcia MD  Date:    04/03/2020  Time:    17:45             Narrative:     EXAMINATION:  XR CHEST AP PORTABLE    CLINICAL HISTORY:  Shortness of breath    TECHNIQUE:  Single frontal view of the chest was performed.    COMPARISON:  09/01/2011    FINDINGS:  The cardiomediastinal silhouette is not enlarged.  There is no pleural effusion.  The trachea is midline.  The lungs are symmetrically expanded bilaterally without evidence of acute parenchymal process. No large focal consolidation seen.  There is no pneumothorax.  The osseous structures are unremarkable.                              X-Rays:   Independently Interpreted Readings:   Chest X-Ray: Normal heart size.  No infiltrates.  No acute abnormalities.     Medical Decision Making:   Initial Assessment:     Urgent evaluation a 35-year-old male presenting to the emergency department for evaluation of shortness of breath.  Patient is afebrile, nontoxic appearing, hemodynamically stable.  Physical exam reveals oxygen saturation of 100%, vital signs are normal.  Patient does appear anxious on exam.  He has known COVID-19 positive.  Will plan for chest x-ray, ambulatory pulse ox and reassess.    Independently Interpreted Test(s):   I have ordered and independently interpreted X-rays - see prior notes.  Clinical Tests:   Radiological Study: Ordered and Reviewed  ED Management:    Chest x-ray does not show any significant findings.  Patient maintained oxygen saturation of 100% while ambulating.    At this point, do not feel any further testing imaging is warranted.  I do believe some of the patient's signs symptoms could be secondary to anxiety.  He does not have evidence of pneumonia.  He completed home treatment at home.  I did have a lengthy discussion in regards to return precautions.  He is also given her prescription for Atarax to help anxiety.  Discharged in stable condition.The patient was instructed to follow up with a primary care provider in 2 days or to return to the emergency department for worsening symptoms. The treatment plan was  discussed with the patient who demonstrated understanding and comfort with plan.      This note was created using M Modal Fluency Direct. There may be typographical errors secondary to dictation.                                    Clinical Impression:     1. COVID-19 virus infection    2. SOB (shortness of breath)             ED Disposition Condition    Discharge Stable        ED Prescriptions     Medication Sig Dispense Start Date End Date Auth. Provider    hydroxyzine HCL (ATARAX) 25 MG tablet Take 1 tablet (25 mg total) by mouth every 6 (six) hours as needed for Anxiety. 12 tablet 4/3/2020  Jessica Valentin PA-C        Follow-up Information     Follow up With Specialties Details Why Contact Info    An Snidre MD Internal Medicine   [address] [facility] [id number]  [phone]      [facility]   [address] [place] [id number] [phone]                                     Jessica Valentin PA-C  04/03/20 6639

## 2020-04-03 NOTE — ED TRIAGE NOTES
"Patient presents to ER for concerns of shortness of breath and feeling "more weak". Patient also reports, "I think I have anxiety". Patient is 100% on room air and denies worsening symptoms otherwise. Patient is A&Ox4 and following commands.   "

## 2020-04-21 DIAGNOSIS — Z01.84 ANTIBODY RESPONSE EXAMINATION: ICD-10-CM

## 2020-05-11 ENCOUNTER — LAB VISIT (OUTPATIENT)
Dept: LAB | Facility: HOSPITAL | Age: 35
End: 2020-05-11
Attending: INTERNAL MEDICINE
Payer: COMMERCIAL

## 2020-05-11 DIAGNOSIS — Z01.84 ANTIBODY RESPONSE EXAMINATION: ICD-10-CM

## 2020-05-11 LAB — SARS-COV-2 IGG SERPLBLD QL IA.RAPID: POSITIVE

## 2020-05-11 PROCEDURE — 36415 COLL VENOUS BLD VENIPUNCTURE: CPT

## 2020-05-11 PROCEDURE — 86769 SARS-COV-2 COVID-19 ANTIBODY: CPT

## 2020-11-03 ENCOUNTER — OFFICE VISIT (OUTPATIENT)
Dept: INTERNAL MEDICINE | Facility: CLINIC | Age: 35
End: 2020-11-03
Payer: COMMERCIAL

## 2020-11-03 ENCOUNTER — LAB VISIT (OUTPATIENT)
Dept: LAB | Facility: HOSPITAL | Age: 35
End: 2020-11-03
Attending: INTERNAL MEDICINE
Payer: COMMERCIAL

## 2020-11-03 VITALS
RESPIRATION RATE: 18 BRPM | SYSTOLIC BLOOD PRESSURE: 116 MMHG | HEIGHT: 64 IN | OXYGEN SATURATION: 98 % | HEART RATE: 78 BPM | WEIGHT: 144.38 LBS | BODY MASS INDEX: 24.65 KG/M2 | TEMPERATURE: 98 F | DIASTOLIC BLOOD PRESSURE: 74 MMHG

## 2020-11-03 DIAGNOSIS — M25.562 CHRONIC PAIN OF BOTH KNEES: ICD-10-CM

## 2020-11-03 DIAGNOSIS — R19.5 LOOSE STOOLS: ICD-10-CM

## 2020-11-03 DIAGNOSIS — M25.561 CHRONIC PAIN OF BOTH KNEES: ICD-10-CM

## 2020-11-03 DIAGNOSIS — G89.29 CHRONIC PAIN OF BOTH KNEES: ICD-10-CM

## 2020-11-03 DIAGNOSIS — Z00.00 ANNUAL PHYSICAL EXAM: Primary | ICD-10-CM

## 2020-11-03 DIAGNOSIS — Z00.00 ANNUAL PHYSICAL EXAM: ICD-10-CM

## 2020-11-03 LAB
ALBUMIN SERPL BCP-MCNC: 4.6 G/DL (ref 3.5–5.2)
ALP SERPL-CCNC: 95 U/L (ref 55–135)
ALT SERPL W/O P-5'-P-CCNC: 24 U/L (ref 10–44)
ANION GAP SERPL CALC-SCNC: 12 MMOL/L (ref 8–16)
AST SERPL-CCNC: 21 U/L (ref 10–40)
BASOPHILS # BLD AUTO: 0.02 K/UL (ref 0–0.2)
BASOPHILS NFR BLD: 0.3 % (ref 0–1.9)
BILIRUB SERPL-MCNC: 1.5 MG/DL (ref 0.1–1)
BUN SERPL-MCNC: 15 MG/DL (ref 6–20)
CALCIUM SERPL-MCNC: 10.2 MG/DL (ref 8.7–10.5)
CHLORIDE SERPL-SCNC: 103 MMOL/L (ref 95–110)
CHOLEST SERPL-MCNC: 168 MG/DL (ref 120–199)
CHOLEST/HDLC SERPL: 2.7 {RATIO} (ref 2–5)
CO2 SERPL-SCNC: 24 MMOL/L (ref 23–29)
CREAT SERPL-MCNC: 1.1 MG/DL (ref 0.5–1.4)
DIFFERENTIAL METHOD: NORMAL
EOSINOPHIL # BLD AUTO: 0.3 K/UL (ref 0–0.5)
EOSINOPHIL NFR BLD: 4 % (ref 0–8)
ERYTHROCYTE [DISTWIDTH] IN BLOOD BY AUTOMATED COUNT: 12 % (ref 11.5–14.5)
EST. GFR  (AFRICAN AMERICAN): >60 ML/MIN/1.73 M^2
EST. GFR  (NON AFRICAN AMERICAN): >60 ML/MIN/1.73 M^2
ESTIMATED AVG GLUCOSE: 100 MG/DL (ref 68–131)
GLUCOSE SERPL-MCNC: 97 MG/DL (ref 70–110)
HBA1C MFR BLD HPLC: 5.1 % (ref 4–5.6)
HCT VFR BLD AUTO: 48.6 % (ref 40–54)
HDLC SERPL-MCNC: 63 MG/DL (ref 40–75)
HDLC SERPL: 37.5 % (ref 20–50)
HGB BLD-MCNC: 15.9 G/DL (ref 14–18)
IGA SERPL-MCNC: 272 MG/DL (ref 40–350)
IMM GRANULOCYTES # BLD AUTO: 0.01 K/UL (ref 0–0.04)
IMM GRANULOCYTES NFR BLD AUTO: 0.2 % (ref 0–0.5)
LDLC SERPL CALC-MCNC: 85.4 MG/DL (ref 63–159)
LYMPHOCYTES # BLD AUTO: 1.9 K/UL (ref 1–4.8)
LYMPHOCYTES NFR BLD: 29.6 % (ref 18–48)
MCH RBC QN AUTO: 30 PG (ref 27–31)
MCHC RBC AUTO-ENTMCNC: 32.7 G/DL (ref 32–36)
MCV RBC AUTO: 92 FL (ref 82–98)
MONOCYTES # BLD AUTO: 0.8 K/UL (ref 0.3–1)
MONOCYTES NFR BLD: 12.4 % (ref 4–15)
NEUTROPHILS # BLD AUTO: 3.4 K/UL (ref 1.8–7.7)
NEUTROPHILS NFR BLD: 53.5 % (ref 38–73)
NONHDLC SERPL-MCNC: 105 MG/DL
NRBC BLD-RTO: 0 /100 WBC
PLATELET # BLD AUTO: 292 K/UL (ref 150–350)
PMV BLD AUTO: 9.8 FL (ref 9.2–12.9)
POTASSIUM SERPL-SCNC: 4.6 MMOL/L (ref 3.5–5.1)
PROT SERPL-MCNC: 7.7 G/DL (ref 6–8.4)
RBC # BLD AUTO: 5.3 M/UL (ref 4.6–6.2)
SODIUM SERPL-SCNC: 139 MMOL/L (ref 136–145)
TRIGL SERPL-MCNC: 98 MG/DL (ref 30–150)
TSH SERPL DL<=0.005 MIU/L-ACNC: 1.23 UIU/ML (ref 0.4–4)
WBC # BLD AUTO: 6.31 K/UL (ref 3.9–12.7)

## 2020-11-03 PROCEDURE — 83036 HEMOGLOBIN GLYCOSYLATED A1C: CPT

## 2020-11-03 PROCEDURE — 83516 IMMUNOASSAY NONANTIBODY: CPT

## 2020-11-03 PROCEDURE — 80061 LIPID PANEL: CPT

## 2020-11-03 PROCEDURE — 85025 COMPLETE CBC W/AUTO DIFF WBC: CPT

## 2020-11-03 PROCEDURE — 99395 PREV VISIT EST AGE 18-39: CPT | Mod: S$GLB,,, | Performed by: INTERNAL MEDICINE

## 2020-11-03 PROCEDURE — 82784 ASSAY IGA/IGD/IGG/IGM EACH: CPT

## 2020-11-03 PROCEDURE — 84443 ASSAY THYROID STIM HORMONE: CPT

## 2020-11-03 PROCEDURE — 99999 PR PBB SHADOW E&M-EST. PATIENT-LVL III: ICD-10-PCS | Mod: PBBFAC,,, | Performed by: INTERNAL MEDICINE

## 2020-11-03 PROCEDURE — 99395 PR PREVENTIVE VISIT,EST,18-39: ICD-10-PCS | Mod: S$GLB,,, | Performed by: INTERNAL MEDICINE

## 2020-11-03 PROCEDURE — 99999 PR PBB SHADOW E&M-EST. PATIENT-LVL III: CPT | Mod: PBBFAC,,, | Performed by: INTERNAL MEDICINE

## 2020-11-03 PROCEDURE — 80053 COMPREHEN METABOLIC PANEL: CPT

## 2020-11-03 PROCEDURE — 36415 COLL VENOUS BLD VENIPUNCTURE: CPT | Mod: PO

## 2020-11-03 NOTE — PROGRESS NOTES
Subjective:       Patient ID: Bert Bob is a 35 y.o. male.    Chief Complaint: Annual Exam (Both knees but left knee is the worst), Knee Pain, and Hip Pain (Left hip)    HPI    35 y.o. male here for annual exam.     Health Maintenance/ Screening:   Labs: due    Vaccines:   Influenza: utd   Tetanus: thinks utd      Past Medical History:   Diagnosis Date    Asthma      Past Surgical History:   Procedure Laterality Date    HERNIA REPAIR      TYMPANOSTOMY TUBE PLACEMENT      URETER SURGERY      As a child     Family History   Problem Relation Age of Onset    Hypertension Mother     Heart failure Mother     Hernia Maternal Uncle     Hernia Maternal Grandmother     Heart failure Maternal Grandmother     Hernia Maternal Uncle     Hypertension Father     Lymphoma Father         B cell     Social History     Socioeconomic History    Marital status:      Spouse name: Not on file    Number of children: 1    Years of education: Not on file    Highest education level: Not on file   Occupational History    Occupation: Registered Nurse     Employer: [employer]   Social Needs    Financial resource strain: Not hard at all    Food insecurity     Worry: Never true     Inability: Never true    Transportation needs     Medical: No     Non-medical: No   Tobacco Use    Smoking status: Never Smoker    Smokeless tobacco: Never Used   Substance and Sexual Activity    Alcohol use: Yes     Frequency: 2-3 times a week     Drinks per session: 1 or 2     Binge frequency: Never     Comment: occasional    Drug use: No    Sexual activity: Not on file   Lifestyle    Physical activity     Days per week: 3 days     Minutes per session: 30 min    Stress: To some extent   Relationships    Social connections     Talks on phone: Three times a week     Gets together: Once a week     Attends [employer]ism service: Not on file     Active member of club or organization: Yes     Attends meetings of clubs  "or organizations: More than 4 times per year     Relationship status:    Other Topics Concern    Not on file   Social History Narrative    Not on file     Review of patient's allergies indicates:  No Known Allergies    Current Outpatient Medications:     levalbuterol (XOPENEX) 0.63 mg/3 mL nebulizer solution, Inhale contents of 1 vial (3 mLs) by nebulization every 4 (four) hours as needed for Wheezing., Disp: 90 mL, Rfl: 12    VIOS AEROSOL DELIVERY SYSTEM Martina, USE UTD TID, Disp: , Rfl: 0    Review of Systems   Constitutional: Negative for activity change and unexpected weight change.   HENT: Negative for hearing loss, rhinorrhea and trouble swallowing.    Eyes: Negative for discharge and visual disturbance.   Respiratory: Negative for chest tightness and wheezing.    Cardiovascular: Negative for chest pain and palpitations.   Gastrointestinal: Positive for diarrhea (loose stools). Negative for abdominal pain, blood in stool, constipation and vomiting.   Endocrine: Negative for polydipsia and polyuria.   Genitourinary: Negative for difficulty urinating, hematuria and urgency.   Musculoskeletal: Positive for arthralgias. Negative for joint swelling and neck pain.   Neurological: Negative for weakness and headaches.   Psychiatric/Behavioral: Negative for confusion and dysphoric mood.       Objective:        Vitals:    11/03/20 1030   BP: 116/74   BP Location: Left arm   Patient Position: Sitting   BP Method: Medium (Manual)   Pulse: 78   Resp: 18   Temp: 97.7 °F (36.5 °C)   TempSrc: Temporal   SpO2: 98%   Weight: 65.5 kg (144 lb 6.4 oz)   Height: 5' 4" (1.626 m)       Body mass index is 24.79 kg/m².    Physical Exam  Constitutional:       General: He is not in acute distress.     Appearance: He is well-developed. He is not diaphoretic.   HENT:      Head: Normocephalic and atraumatic.      Right Ear: Tympanic membrane is scarred.      Left Ear: Tympanic membrane is scarred.   Eyes:      Conjunctiva/sclera: " Conjunctivae normal.   Neck:      Musculoskeletal: Neck supple.      Thyroid: No thyromegaly.      Trachea: No tracheal deviation.   Cardiovascular:      Rate and Rhythm: Normal rate and regular rhythm.      Heart sounds: Normal heart sounds.   Pulmonary:      Effort: Pulmonary effort is normal.      Breath sounds: Normal breath sounds.   Abdominal:      General: Bowel sounds are normal. There is no distension.      Palpations: Abdomen is soft.      Tenderness: There is no abdominal tenderness. There is no guarding.   Musculoskeletal:      Right knee: He exhibits no swelling. No tenderness found.      Left knee: He exhibits no swelling. No tenderness found.      Right lower leg: No edema.      Left lower leg: No edema.      Comments: Crepitus bilateral knees   Lymphadenopathy:      Cervical: No cervical adenopathy.   Skin:     General: Skin is warm and dry.      Nails: There is no clubbing.     Neurological:      General: No focal deficit present.      Mental Status: He is alert. Mental status is at baseline.      Sensory: No sensory deficit.   Psychiatric:         Behavior: Behavior normal.         Judgment: Judgment normal.         Assessment:     1. Annual physical exam    2. Loose stools    3. Chronic pain of both knees           Plan:         1. Annual physical exam  - flu vaccine utd  - he will check with employee health about tetanus vaccine  - CBC Auto Differential; Future  - Comprehensive Metabolic Panel; Future  - Hemoglobin A1C; Future  - Lipid Panel; Future  - TSH; Future    2. Loose stools  - chronic. Previous stool studies normal. Known lactose intolerance.   Check for celiac. Check cmp and tsh. No signs of malabsorption based on previous labs. No blood or pain.   - Tissue transglutaminase, IgA; Future  - IgA; Future    3. Chronic pain of both knees  - he suspects OA d/t active lifestyle and previous sports. No acute pain- more chronic over time worsening. + crepitus.   - he has started from PT  exercises as given to him by his sister  - rarely needs otc nsaids  - discussed vionic shoes, continue HEP. Notify if worsening.            Patient note was created using MModal Dictation.  Any errors in syntax or even information may not have been identified and edited on initial review prior to signing this note.

## 2020-11-04 ENCOUNTER — TELEPHONE (OUTPATIENT)
Dept: INTERNAL MEDICINE | Facility: CLINIC | Age: 35
End: 2020-11-04

## 2020-11-04 NOTE — TELEPHONE ENCOUNTER
----- Message from An Snider MD sent at 11/4/2020  5:51 AM CST -----  Results released to patient portal.

## 2020-11-06 ENCOUNTER — TELEPHONE (OUTPATIENT)
Dept: INTERNAL MEDICINE | Facility: CLINIC | Age: 35
End: 2020-11-06

## 2020-11-06 LAB — TTG IGA SER-ACNC: 4 UNITS

## 2020-11-06 NOTE — TELEPHONE ENCOUNTER
----- Message from An Snider MD sent at 11/6/2020 12:45 PM CST -----  Results released to patient portal.

## 2020-12-17 ENCOUNTER — IMMUNIZATION (OUTPATIENT)
Dept: INTERNAL MEDICINE | Facility: CLINIC | Age: 35
End: 2020-12-17
Payer: COMMERCIAL

## 2020-12-17 DIAGNOSIS — Z23 NEED FOR VACCINATION: ICD-10-CM

## 2020-12-17 PROCEDURE — 91300 COVID-19, MRNA, LNP-S, PF, 30 MCG/0.3 ML DOSE VACCINE: CPT | Mod: ,,, | Performed by: INTERNAL MEDICINE

## 2020-12-17 PROCEDURE — 0001A COVID-19, MRNA, LNP-S, PF, 30 MCG/0.3 ML DOSE VACCINE: CPT | Mod: CV19,,, | Performed by: INTERNAL MEDICINE

## 2020-12-17 PROCEDURE — 0001A COVID-19, MRNA, LNP-S, PF, 30 MCG/0.3 ML DOSE VACCINE: ICD-10-PCS | Mod: CV19,,, | Performed by: INTERNAL MEDICINE

## 2020-12-17 PROCEDURE — 91300 COVID-19, MRNA, LNP-S, PF, 30 MCG/0.3 ML DOSE VACCINE: ICD-10-PCS | Mod: ,,, | Performed by: INTERNAL MEDICINE

## 2021-01-08 ENCOUNTER — IMMUNIZATION (OUTPATIENT)
Dept: INTERNAL MEDICINE | Facility: CLINIC | Age: 36
End: 2021-01-08
Payer: COMMERCIAL

## 2021-01-08 DIAGNOSIS — Z23 NEED FOR VACCINATION: ICD-10-CM

## 2021-01-08 PROCEDURE — 91300 COVID-19, MRNA, LNP-S, PF, 30 MCG/0.3 ML DOSE VACCINE: CPT | Mod: PBBFAC | Performed by: INTERNAL MEDICINE

## 2021-01-08 PROCEDURE — 0002A COVID-19, MRNA, LNP-S, PF, 30 MCG/0.3 ML DOSE VACCINE: CPT | Mod: PBBFAC | Performed by: INTERNAL MEDICINE

## 2021-02-01 ENCOUNTER — TELEPHONE (OUTPATIENT)
Dept: INTERNAL MEDICINE | Facility: CLINIC | Age: 36
End: 2021-02-01

## 2021-02-01 DIAGNOSIS — Z30.09 VASECTOMY EVALUATION: Primary | ICD-10-CM

## 2021-03-04 ENCOUNTER — OFFICE VISIT (OUTPATIENT)
Dept: UROLOGY | Facility: CLINIC | Age: 36
End: 2021-03-04
Payer: COMMERCIAL

## 2021-03-04 VITALS
HEART RATE: 92 BPM | HEIGHT: 64 IN | DIASTOLIC BLOOD PRESSURE: 87 MMHG | BODY MASS INDEX: 25.21 KG/M2 | WEIGHT: 147.69 LBS | SYSTOLIC BLOOD PRESSURE: 144 MMHG

## 2021-03-04 DIAGNOSIS — Z30.2 ENCOUNTER FOR MALE STERILIZATION PROCEDURE: ICD-10-CM

## 2021-03-04 PROBLEM — Z20.822 CLOSE EXPOSURE TO COVID-19 VIRUS: Status: RESOLVED | Noted: 2020-03-28 | Resolved: 2021-03-04

## 2021-03-04 PROCEDURE — 3008F PR BODY MASS INDEX (BMI) DOCUMENTED: ICD-10-PCS | Mod: CPTII,S$GLB,, | Performed by: UROLOGY

## 2021-03-04 PROCEDURE — 99999 PR PBB SHADOW E&M-EST. PATIENT-LVL IV: ICD-10-PCS | Mod: PBBFAC,,, | Performed by: UROLOGY

## 2021-03-04 PROCEDURE — 1126F PR PAIN SEVERITY QUANTIFIED, NO PAIN PRESENT: ICD-10-PCS | Mod: S$GLB,,, | Performed by: UROLOGY

## 2021-03-04 PROCEDURE — 1126F AMNT PAIN NOTED NONE PRSNT: CPT | Mod: S$GLB,,, | Performed by: UROLOGY

## 2021-03-04 PROCEDURE — 99999 PR PBB SHADOW E&M-EST. PATIENT-LVL IV: CPT | Mod: PBBFAC,,, | Performed by: UROLOGY

## 2021-03-04 PROCEDURE — 3008F BODY MASS INDEX DOCD: CPT | Mod: CPTII,S$GLB,, | Performed by: UROLOGY

## 2021-03-04 PROCEDURE — 99204 PR OFFICE/OUTPT VISIT, NEW, LEVL IV, 45-59 MIN: ICD-10-PCS | Mod: S$GLB,,, | Performed by: UROLOGY

## 2021-03-04 PROCEDURE — 99204 OFFICE O/P NEW MOD 45 MIN: CPT | Mod: S$GLB,,, | Performed by: UROLOGY

## 2021-03-04 RX ORDER — LIDOCAINE HYDROCHLORIDE 10 MG/ML
20 INJECTION INFILTRATION; PERINEURAL ONCE
Status: COMPLETED | OUTPATIENT
Start: 2021-04-03 | End: 2021-05-07

## 2021-05-06 ENCOUNTER — PATIENT MESSAGE (OUTPATIENT)
Dept: UROLOGY | Facility: CLINIC | Age: 36
End: 2021-05-06

## 2021-05-07 ENCOUNTER — PROCEDURE VISIT (OUTPATIENT)
Dept: UROLOGY | Facility: CLINIC | Age: 36
End: 2021-05-07
Payer: COMMERCIAL

## 2021-05-07 VITALS
SYSTOLIC BLOOD PRESSURE: 118 MMHG | HEART RATE: 82 BPM | WEIGHT: 146.31 LBS | TEMPERATURE: 99 F | RESPIRATION RATE: 17 BRPM | HEIGHT: 64 IN | BODY MASS INDEX: 24.98 KG/M2 | DIASTOLIC BLOOD PRESSURE: 84 MMHG

## 2021-05-07 DIAGNOSIS — Z30.2 ENCOUNTER FOR MALE STERILIZATION PROCEDURE: ICD-10-CM

## 2021-05-07 PROCEDURE — 55250 PR REMOVAL OF SPERM DUCT(S): ICD-10-PCS | Mod: S$GLB,,, | Performed by: UROLOGY

## 2021-05-07 PROCEDURE — 55250 REMOVAL OF SPERM DUCT(S): CPT | Mod: S$GLB,,, | Performed by: UROLOGY

## 2021-05-07 RX ORDER — CEPHALEXIN 500 MG/1
500 CAPSULE ORAL 4 TIMES DAILY
Qty: 8 CAPSULE | Refills: 0 | Status: SHIPPED | OUTPATIENT
Start: 2021-05-07 | End: 2021-05-07 | Stop reason: SDUPTHER

## 2021-05-07 RX ORDER — OXYCODONE AND ACETAMINOPHEN 5; 325 MG/1; MG/1
1 TABLET ORAL EVERY 4 HOURS PRN
Qty: 8 TABLET | Refills: 0 | Status: SHIPPED | OUTPATIENT
Start: 2021-05-07 | End: 2021-05-17

## 2021-05-07 RX ORDER — CEPHALEXIN 500 MG/1
500 CAPSULE ORAL 4 TIMES DAILY
Qty: 8 CAPSULE | Refills: 0 | Status: SHIPPED | OUTPATIENT
Start: 2021-05-07 | End: 2021-05-09

## 2021-05-07 RX ADMIN — LIDOCAINE HYDROCHLORIDE 20 ML: 10 INJECTION INFILTRATION; PERINEURAL at 09:05

## 2021-06-28 ENCOUNTER — TELEPHONE (OUTPATIENT)
Dept: UROLOGY | Facility: CLINIC | Age: 36
End: 2021-06-28

## 2021-12-02 ENCOUNTER — IMMUNIZATION (OUTPATIENT)
Dept: INTERNAL MEDICINE | Facility: CLINIC | Age: 36
End: 2021-12-02
Payer: COMMERCIAL

## 2021-12-02 DIAGNOSIS — Z23 NEED FOR VACCINATION: Primary | ICD-10-CM

## 2021-12-02 PROCEDURE — 0004A COVID-19, MRNA, LNP-S, PF, 30 MCG/0.3 ML DOSE VACCINE: CPT | Mod: CV19,PBBFAC | Performed by: INTERNAL MEDICINE

## 2022-01-18 ENCOUNTER — PATIENT MESSAGE (OUTPATIENT)
Dept: ADMINISTRATIVE | Facility: HOSPITAL | Age: 37
End: 2022-01-18
Payer: COMMERCIAL

## 2022-03-16 ENCOUNTER — PATIENT MESSAGE (OUTPATIENT)
Dept: ADMINISTRATIVE | Facility: HOSPITAL | Age: 37
End: 2022-03-16
Payer: COMMERCIAL

## 2022-05-03 ENCOUNTER — PATIENT MESSAGE (OUTPATIENT)
Dept: RESEARCH | Facility: HOSPITAL | Age: 37
End: 2022-05-03
Payer: COMMERCIAL

## 2022-07-19 ENCOUNTER — OFFICE VISIT (OUTPATIENT)
Dept: DERMATOLOGY | Facility: CLINIC | Age: 37
End: 2022-07-19
Payer: COMMERCIAL

## 2022-07-19 DIAGNOSIS — L01.00 IMPETIGO: Primary | ICD-10-CM

## 2022-07-19 PROCEDURE — 99999 PR PBB SHADOW E&M-EST. PATIENT-LVL I: ICD-10-PCS | Mod: PBBFAC,,, | Performed by: DERMATOLOGY

## 2022-07-19 PROCEDURE — 99499 UNLISTED E&M SERVICE: CPT | Mod: S$GLB,,, | Performed by: DERMATOLOGY

## 2022-07-19 PROCEDURE — 99499 NO LOS: ICD-10-PCS | Mod: S$GLB,,, | Performed by: DERMATOLOGY

## 2022-07-19 PROCEDURE — 99999 PR PBB SHADOW E&M-EST. PATIENT-LVL I: CPT | Mod: PBBFAC,,, | Performed by: DERMATOLOGY

## 2022-07-19 RX ORDER — DOXYCYCLINE 100 MG/1
CAPSULE ORAL
Qty: 20 CAPSULE | Refills: 0 | Status: SHIPPED | OUTPATIENT
Start: 2022-07-19

## 2022-07-19 NOTE — PROGRESS NOTES
Subjective:       Patient ID:  Bert Bob is a 37 y.o. male who presents for No chief complaint on file.    History of Present Illness: The patient presents with chief complaint of rash.  Location: face  Duration: 3 days  Signs/Symptoms: itchy oozing    Prior treatments: muprocin      Review of Systems     Objective:    Physical Exam   Constitutional: He appears well-developed and well-nourished. No distress.   Neurological: He is alert and oriented to person, place, and time. He is not disoriented.   Psychiatric: He has a normal mood and affect.   Skin:   Areas Examined (abnormalities noted in diagram):   Head / Face Inspection Performed              Diagram Legend     Erythematous scaling macule/papule c/w actinic keratosis       Vascular papule c/w angioma      Pigmented verrucoid papule/plaque c/w seborrheic keratosis      Yellow umbilicated papule c/w sebaceous hyperplasia      Irregularly shaped tan macule c/w lentigo     1-2 mm smooth white papules consistent with Milia      Movable subcutaneous cyst with punctum c/w epidermal inclusion cyst      Subcutaneous movable cyst c/w pilar cyst      Firm pink to brown papule c/w dermatofibroma      Pedunculated fleshy papule(s) c/w skin tag(s)      Evenly pigmented macule c/w junctional nevus     Mildly variegated pigmented, slightly irregular-bordered macule c/w mildly atypical nevus      Flesh colored to evenly pigmented papule c/w intradermal nevus       Pink pearly papule/plaque c/w basal cell carcinoma      Erythematous hyperkeratotic cursted plaque c/w SCC      Surgical scar with no sign of skin cancer recurrence      Open and closed comedones      Inflammatory papules and pustules      Verrucoid papule consistent consistent with wart     Erythematous eczematous patches and plaques     Dystrophic onycholytic nail with subungual debris c/w onychomycosis     Umbilicated papule    Erythematous-base heme-crusted tan verrucoid plaque consistent with  inflamed seborrheic keratosis     Erythematous Silvery Scaling Plaque c/w Psoriasis     See annotation      Assessment / Plan:        Impetigo  -     doxycycline (VIBRAMYCIN) 100 MG Cap; Take 1 capsule by mouth twice daily for 10 days  Dispense: 20 capsule; Refill: 0    Discussed benefits and risks of doxycyline therapy including but not limited to GI discomfort, esophageal irritation/ulceration, and increased sun sensitivity. Patient was counseled to take medicine with meals and at least 1 hour before lying down.            No follow-ups on file.

## 2023-05-17 DIAGNOSIS — L01.00 IMPETIGO: ICD-10-CM

## 2023-05-17 DIAGNOSIS — L70.9 ACNE, UNSPECIFIED ACNE TYPE: Primary | ICD-10-CM

## 2023-05-17 RX ORDER — PREDNISONE 10 MG/1
10 TABLET ORAL DAILY
Qty: 30 TABLET | Refills: 0 | Status: SHIPPED | OUTPATIENT
Start: 2023-05-17 | End: 2023-06-16

## 2023-09-17 RX ORDER — PROPRANOLOL HYDROCHLORIDE 10 MG/1
10 TABLET ORAL 2 TIMES DAILY
Qty: 60 TABLET | Refills: 11 | Status: SHIPPED | OUTPATIENT
Start: 2023-09-17 | End: 2024-09-16

## 2023-10-05 ENCOUNTER — OFFICE VISIT (OUTPATIENT)
Dept: INTERNAL MEDICINE | Facility: CLINIC | Age: 38
End: 2023-10-05
Payer: COMMERCIAL

## 2023-10-05 VITALS
DIASTOLIC BLOOD PRESSURE: 82 MMHG | OXYGEN SATURATION: 98 % | BODY MASS INDEX: 24.77 KG/M2 | WEIGHT: 145.06 LBS | HEART RATE: 80 BPM | SYSTOLIC BLOOD PRESSURE: 122 MMHG | HEIGHT: 64 IN

## 2023-10-05 DIAGNOSIS — D22.9 MULTIPLE NEVI: ICD-10-CM

## 2023-10-05 DIAGNOSIS — Z00.00 ANNUAL PHYSICAL EXAM: Primary | ICD-10-CM

## 2023-10-05 DIAGNOSIS — F41.1 ANXIETY STATE: ICD-10-CM

## 2023-10-05 PROCEDURE — 3074F SYST BP LT 130 MM HG: CPT | Mod: CPTII,S$GLB,, | Performed by: INTERNAL MEDICINE

## 2023-10-05 PROCEDURE — 3008F PR BODY MASS INDEX (BMI) DOCUMENTED: ICD-10-PCS | Mod: CPTII,S$GLB,, | Performed by: INTERNAL MEDICINE

## 2023-10-05 PROCEDURE — 99999 PR PBB SHADOW E&M-EST. PATIENT-LVL IV: ICD-10-PCS | Mod: PBBFAC,,, | Performed by: INTERNAL MEDICINE

## 2023-10-05 PROCEDURE — 1159F MED LIST DOCD IN RCRD: CPT | Mod: CPTII,S$GLB,, | Performed by: INTERNAL MEDICINE

## 2023-10-05 PROCEDURE — 99999 PR PBB SHADOW E&M-EST. PATIENT-LVL IV: CPT | Mod: PBBFAC,,, | Performed by: INTERNAL MEDICINE

## 2023-10-05 PROCEDURE — 1160F PR REVIEW ALL MEDS BY PRESCRIBER/CLIN PHARMACIST DOCUMENTED: ICD-10-PCS | Mod: CPTII,S$GLB,, | Performed by: INTERNAL MEDICINE

## 2023-10-05 PROCEDURE — 3074F PR MOST RECENT SYSTOLIC BLOOD PRESSURE < 130 MM HG: ICD-10-PCS | Mod: CPTII,S$GLB,, | Performed by: INTERNAL MEDICINE

## 2023-10-05 PROCEDURE — 1160F RVW MEDS BY RX/DR IN RCRD: CPT | Mod: CPTII,S$GLB,, | Performed by: INTERNAL MEDICINE

## 2023-10-05 PROCEDURE — 3079F PR MOST RECENT DIASTOLIC BLOOD PRESSURE 80-89 MM HG: ICD-10-PCS | Mod: CPTII,S$GLB,, | Performed by: INTERNAL MEDICINE

## 2023-10-05 PROCEDURE — 3008F BODY MASS INDEX DOCD: CPT | Mod: CPTII,S$GLB,, | Performed by: INTERNAL MEDICINE

## 2023-10-05 PROCEDURE — 99395 PR PREVENTIVE VISIT,EST,18-39: ICD-10-PCS | Mod: S$GLB,,, | Performed by: INTERNAL MEDICINE

## 2023-10-05 PROCEDURE — 1159F PR MEDICATION LIST DOCUMENTED IN MEDICAL RECORD: ICD-10-PCS | Mod: CPTII,S$GLB,, | Performed by: INTERNAL MEDICINE

## 2023-10-05 PROCEDURE — 3079F DIAST BP 80-89 MM HG: CPT | Mod: CPTII,S$GLB,, | Performed by: INTERNAL MEDICINE

## 2023-10-05 PROCEDURE — 99395 PREV VISIT EST AGE 18-39: CPT | Mod: S$GLB,,, | Performed by: INTERNAL MEDICINE

## 2023-10-05 RX ORDER — CITALOPRAM 10 MG/1
10 TABLET ORAL DAILY
Qty: 30 TABLET | Refills: 1 | Status: SHIPPED | OUTPATIENT
Start: 2023-10-05 | End: 2024-01-03 | Stop reason: SDUPTHER

## 2023-10-05 NOTE — PROGRESS NOTES
38-year-old male   Presents for routine visit but also to address issues of anxiety.    Within the past year he states that he is constantly in the anxious or agitated state.  A fight or flight state.  He reports a really does not have anything to be anxious but there is a lot of demands.  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  Has 5 children.  He just got back into physical activity jogging a couple times a week.    At times he feels a may have a panic attack situation in that he will have palpitations.  Feeling lightheaded.  Breathing hard.  Reports no chest pain.    In the past he is had issues of palpitations 1 time underwent a 24 hour Holter which was unrevealing.    There was 1 time where he had to give electrode for an of a large Jardiance for which he felt there will be anxiety.  Took a 10 mg propranolol prior and felt well during the time    Medical history   Sinus surgery   Tympanoplasty   Hernia repair   Childhood asthma  [place]evi      Social history   Tobacco use none   Alcohol use occasion   5 children    Family history   Father, , lymphoma   Mother hypertension, heart failure    Review of symptoms   Presently no difficulty breathing, chest pain or abdominal pain.  Regular bowel function.  No difficulty urinating.  No heartburn indigestion    Examination   Weight 145 lb   Pulse 80 taken by me   Blood pressure 122/72  HEENT exam, small perforation right tympanic membrane, scoring left tympanic membrane  Neck, no thyromegaly no masses  Chest clear breath sounds good effort  Heart regular rate rhythm, no murmurs gallops   Abdominal exam soft nontender no hepatosplenomegaly abdominal masses bruits  Pulses 2+ carotid pulses no bruits, 2+ pedal pulses  Extremities, no edema  Lymph gland, no palpable adenopathy  Skin multiple nevi noted on his trunk.  Seems uniform in color size.  He is followed regularly by dermatologist    Impression   General  examination  Anxiety state  Multiple nevi    Plan   Citalopram 10 mg daily  Fasting routine labs  Referral to Psychiatry

## 2023-10-06 ENCOUNTER — LAB VISIT (OUTPATIENT)
Dept: LAB | Facility: HOSPITAL | Age: 38
End: 2023-10-06
Attending: INTERNAL MEDICINE
Payer: COMMERCIAL

## 2023-10-06 DIAGNOSIS — F41.1 ANXIETY STATE: ICD-10-CM

## 2023-10-06 DIAGNOSIS — D22.9 MULTIPLE NEVI: ICD-10-CM

## 2023-10-06 DIAGNOSIS — Z00.00 ANNUAL PHYSICAL EXAM: ICD-10-CM

## 2023-10-06 LAB
ALBUMIN SERPL BCP-MCNC: 4.2 G/DL (ref 3.5–5.2)
ALP SERPL-CCNC: 68 U/L (ref 55–135)
ALT SERPL W/O P-5'-P-CCNC: 12 U/L (ref 10–44)
ANION GAP SERPL CALC-SCNC: 8 MMOL/L (ref 8–16)
AST SERPL-CCNC: 11 U/L (ref 10–40)
BASOPHILS # BLD AUTO: 0.01 K/UL (ref 0–0.2)
BASOPHILS NFR BLD: 0.2 % (ref 0–1.9)
BILIRUB SERPL-MCNC: 1.1 MG/DL (ref 0.1–1)
BUN SERPL-MCNC: 17 MG/DL (ref 6–20)
CALCIUM SERPL-MCNC: 9.2 MG/DL (ref 8.7–10.5)
CHLORIDE SERPL-SCNC: 105 MMOL/L (ref 95–110)
CHOLEST SERPL-MCNC: 185 MG/DL (ref 120–199)
CHOLEST/HDLC SERPL: 3.2 {RATIO} (ref 2–5)
CO2 SERPL-SCNC: 26 MMOL/L (ref 23–29)
CREAT SERPL-MCNC: 1 MG/DL (ref 0.5–1.4)
DIFFERENTIAL METHOD: NORMAL
EOSINOPHIL # BLD AUTO: 0 K/UL (ref 0–0.5)
EOSINOPHIL NFR BLD: 0.6 % (ref 0–8)
ERYTHROCYTE [DISTWIDTH] IN BLOOD BY AUTOMATED COUNT: 12.7 % (ref 11.5–14.5)
EST. GFR  (NO RACE VARIABLE): >60 ML/MIN/1.73 M^2
GLUCOSE SERPL-MCNC: 100 MG/DL (ref 70–110)
HCT VFR BLD AUTO: 44 % (ref 40–54)
HDLC SERPL-MCNC: 57 MG/DL (ref 40–75)
HDLC SERPL: 30.8 % (ref 20–50)
HGB BLD-MCNC: 14.7 G/DL (ref 14–18)
IMM GRANULOCYTES # BLD AUTO: 0.01 K/UL (ref 0–0.04)
IMM GRANULOCYTES NFR BLD AUTO: 0.2 % (ref 0–0.5)
LDLC SERPL CALC-MCNC: 116.6 MG/DL (ref 63–159)
LYMPHOCYTES # BLD AUTO: 2.1 K/UL (ref 1–4.8)
LYMPHOCYTES NFR BLD: 40.5 % (ref 18–48)
MCH RBC QN AUTO: 29.6 PG (ref 27–31)
MCHC RBC AUTO-ENTMCNC: 33.4 G/DL (ref 32–36)
MCV RBC AUTO: 89 FL (ref 82–98)
MONOCYTES # BLD AUTO: 0.5 K/UL (ref 0.3–1)
MONOCYTES NFR BLD: 10.1 % (ref 4–15)
NEUTROPHILS # BLD AUTO: 2.5 K/UL (ref 1.8–7.7)
NEUTROPHILS NFR BLD: 48.4 % (ref 38–73)
NONHDLC SERPL-MCNC: 128 MG/DL
NRBC BLD-RTO: 0 /100 WBC
PLATELET # BLD AUTO: 283 K/UL (ref 150–450)
PMV BLD AUTO: 9.9 FL (ref 9.2–12.9)
POTASSIUM SERPL-SCNC: 4.1 MMOL/L (ref 3.5–5.1)
PROT SERPL-MCNC: 7.1 G/DL (ref 6–8.4)
RBC # BLD AUTO: 4.96 M/UL (ref 4.6–6.2)
SODIUM SERPL-SCNC: 139 MMOL/L (ref 136–145)
TRIGL SERPL-MCNC: 57 MG/DL (ref 30–150)
TSH SERPL DL<=0.005 MIU/L-ACNC: 1.31 UIU/ML (ref 0.4–4)
WBC # BLD AUTO: 5.16 K/UL (ref 3.9–12.7)

## 2023-10-06 PROCEDURE — 84443 ASSAY THYROID STIM HORMONE: CPT | Performed by: INTERNAL MEDICINE

## 2023-10-06 PROCEDURE — 36415 COLL VENOUS BLD VENIPUNCTURE: CPT | Performed by: INTERNAL MEDICINE

## 2023-10-06 PROCEDURE — 80053 COMPREHEN METABOLIC PANEL: CPT | Performed by: INTERNAL MEDICINE

## 2023-10-06 PROCEDURE — 80061 LIPID PANEL: CPT | Performed by: INTERNAL MEDICINE

## 2023-10-06 PROCEDURE — 85025 COMPLETE CBC W/AUTO DIFF WBC: CPT | Performed by: INTERNAL MEDICINE

## 2024-01-03 ENCOUNTER — PATIENT MESSAGE (OUTPATIENT)
Dept: INTERNAL MEDICINE | Facility: CLINIC | Age: 39
End: 2024-01-03
Payer: COMMERCIAL

## 2024-01-03 RX ORDER — CITALOPRAM 10 MG/1
10 TABLET ORAL DAILY
Qty: 30 TABLET | Refills: 1 | Status: CANCELLED | OUTPATIENT
Start: 2024-01-03

## 2024-01-03 RX ORDER — CITALOPRAM 10 MG/1
10 TABLET ORAL DAILY
Qty: 30 TABLET | Refills: 1 | Status: SHIPPED | OUTPATIENT
Start: 2024-01-03

## 2024-01-03 NOTE — TELEPHONE ENCOUNTER
Refill Routing Note   Medication(s) are not appropriate for processing by [facility] for the following reason(s):        Responsible provider unclear    [facility] action(s):  Route             Appointments  past 12m or future 3m with PCP    Date Provider   Last Visit   10/5/2023 Kanu Christopher MD   Next Visit   Visit date not found Kanu Christopher MD   ED visits in past 90 days: 0        Note composed:10:48 AM 01/03/2024

## 2025-03-31 ENCOUNTER — TELEPHONE (OUTPATIENT)
Dept: ORTHOPEDICS | Facility: CLINIC | Age: 40
End: 2025-03-31
Payer: COMMERCIAL

## 2025-03-31 NOTE — TELEPHONE ENCOUNTER
Lvm regarding appt----- Message from Mary sent at 3/31/2025  7:42 AM CDT -----  Regarding: Schedule patient  Can you get this patient in for R Hip Pain?